# Patient Record
Sex: FEMALE | Race: ASIAN | NOT HISPANIC OR LATINO | URBAN - METROPOLITAN AREA
[De-identification: names, ages, dates, MRNs, and addresses within clinical notes are randomized per-mention and may not be internally consistent; named-entity substitution may affect disease eponyms.]

---

## 2020-01-11 ENCOUNTER — EMERGENCY (EMERGENCY)
Facility: HOSPITAL | Age: 25
LOS: 1 days | Discharge: HOME | End: 2020-01-11
Admitting: EMERGENCY MEDICINE
Payer: COMMERCIAL

## 2020-01-11 VITALS
SYSTOLIC BLOOD PRESSURE: 123 MMHG | RESPIRATION RATE: 15 BRPM | DIASTOLIC BLOOD PRESSURE: 73 MMHG | TEMPERATURE: 98 F | WEIGHT: 104.94 LBS | HEART RATE: 90 BPM | HEIGHT: 62 IN

## 2020-01-11 VITALS
RESPIRATION RATE: 18 BRPM | DIASTOLIC BLOOD PRESSURE: 63 MMHG | HEART RATE: 78 BPM | OXYGEN SATURATION: 98 % | SYSTOLIC BLOOD PRESSURE: 99 MMHG

## 2020-01-11 PROCEDURE — 70486 CT MAXILLOFACIAL W/O DYE: CPT | Mod: 26

## 2020-01-11 PROCEDURE — 72125 CT NECK SPINE W/O DYE: CPT | Mod: 26

## 2020-01-11 PROCEDURE — 70450 CT HEAD/BRAIN W/O DYE: CPT | Mod: 26

## 2020-01-11 PROCEDURE — 99284 EMERGENCY DEPT VISIT MOD MDM: CPT

## 2020-01-11 RX ORDER — BACITRACIN ZINC 500 UNIT/G
1 OINTMENT IN PACKET (EA) TOPICAL ONCE
Refills: 0 | Status: COMPLETED | OUTPATIENT
Start: 2020-01-11 | End: 2020-01-11

## 2020-01-11 RX ORDER — ACETAMINOPHEN 500 MG
975 TABLET ORAL ONCE
Refills: 0 | Status: COMPLETED | OUTPATIENT
Start: 2020-01-11 | End: 2020-01-11

## 2020-01-11 RX ADMIN — Medication 975 MILLIGRAM(S): at 06:00

## 2020-01-11 RX ADMIN — Medication 1 APPLICATION(S): at 06:27

## 2020-01-11 RX ADMIN — Medication 975 MILLIGRAM(S): at 04:42
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
## 2020-01-11 NOTE — ED PROVIDER NOTE - NSFOLLOWUPINSTRUCTIONS_ED_ALL_ED_FT
LOCATION / PHONE NUMBER    [facility] is located at [address] [place] [address] Street (corner of [facility]) in [place].    For Emergency Services/Urgent Care, please call [phone].             Emergency Services/Urgent Care    Urgent care for patients with pain, excessive bleeding, swelling, oral infection and/or trauma is provided with no appointment necessary.    Patients requiring urgent care should register in the New Patient Admissions suite, located on the first floor of [facility] at [address] [place] [address] Street ([facility] of [facility]). After registration, patients will be directed to the Urgent Care Suite on the 2nd floor for treatment. On Saturdays, report directly to the 2nd floor.      Every effort is made to see all patients presenting for urgent care in a timely manner. Due to the unpredictability of the nature of urgent care, however, some patients may not be treated at [facility] but will be referred to our affiliates, [facility] ([address], [place]) or [facility] ([address] [facility]), for treatment.      Hours of Operation: Urgent Care    Emergency Services/Urgent Care is available at [facility] during these hours:    MONDAY THROUGH THURSDAY:  8:30 am - 8:00 pm (on a first-come, first-served basis)    FRIDAY AND SATURDAY  8:30 am - 4:00 pm (on a first-come, first-served basis)    *As an academic dental center, [facility] follows an academic calendar and is closed on some federal holidays. Please see our Holiday/Closure Calendar >>> for more information.

## 2020-01-11 NOTE — ED PROVIDER NOTE - PROGRESS NOTE DETAILS
Call placed to [facility] with [employer] message left regarding patient status of admission and [facility] for meds.    CT reviewed. no acute findings. CT reviewed. no acute findings.  no FB on exam. wound cleansed and flushed. no indication for sutures. abrasions.

## 2020-01-11 NOTE — ED PROVIDER NOTE - CLINICAL SUMMARY MEDICAL DECISION MAKING FREE TEXT BOX
s/p mechanical fall. will check CT.  abrasions, no indication for sutures. no FB on exam. dental fractures noted will f/u with dentist

## 2020-01-11 NOTE — ED PROVIDER NOTE - CARE PLAN
Principal Discharge DX:	Facial abrasion, initial encounter  Secondary Diagnosis:	Dental injury, initial encounter

## 2020-01-11 NOTE — ED PROVIDER NOTE - PHYSICAL EXAMINATION
VITAL SIGNS: I have reviewed nursing notes and confirm.  CONSTITUTIONAL: Well-developed; well-nourished; in no acute distress.  SKIN: abrasion to left frontal, abrasion under left eye, abrasion under nose, Skin is warm and dry, no acute rash.  HEAD: Normocephalic; atraumatic.  EYES: PERRL, EOM intact; conjunctiva and sclera clear.  EARS: tympanic membranes clear bilaterally, No redness, normal landmarks and light reflexes, canal clear without cerumen impaction  THROAT: moist mucous membranes, normal dentition, no erythema, no swelling, no exudates, no drooling, no PTA, uvula midline  +dental fracture +9 +25, no active bleeding. +0.1cm superficial laceration upper inner mucosa.   NECK: Supple; non tender.  CARD: S1, S2 normal; no murmurs, gallops, or rubs. Regular rate and rhythm.  RESP: No wheezes, rales or rhonchi.  ABD: Normal bowel sounds; soft; non-distended; non-tender;  no palpable or pulsating mass; no hepatosplenomegaly.  EXT: Normal ROM. No clubbing, cyanosis or edema.  NEURO: Patient is alert, oriented x person, place and time.  Cranial nerves 2-12 are intact.  Normal gait and speech.  Cerebellar testing normal:  negative Romberg, normal coordination and normal finger to nose, heal to shin and rapid alternating movements.  Normal proprioception and sensory exam.  No pronator drift.  5/5 bl upper extremity and lower extremity strength.  PSYCH: Cooperative, appropriate. VITAL SIGNS: I have reviewed nursing notes and confirm.  CONSTITUTIONAL: Well-developed; well-nourished; in no acute distress.  SKIN: abrasion to left frontal, abrasion under left eye, abrasion under nose, Skin is warm and dry, no acute rash.  HEAD: Normocephalic; atraumatic.  EYES: PERRL, EOM intact; conjunctiva and sclera clear.  EARS: tympanic membranes clear bilaterally, No redness, normal landmarks and light reflexes, canal clear without cerumen impaction  THROAT: moist mucous membranes, normal dentition, no erythema, no swelling, no exudates, no drooling, no PTA, uvula midline  +dental fracture +9 +25, no active bleeding. +0.1cm superficial laceration upper inner mucosa.  +swelling to upper lip  no FB on exam  NECK: Supple; non tender.  CARD: S1, S2 normal; no murmurs, gallops, or rubs. Regular rate and rhythm.  RESP: No wheezes, rales or rhonchi.  ABD: Normal bowel sounds; soft; non-distended; non-tender;  no palpable or pulsating mass; no hepatosplenomegaly.  EXT: Normal ROM. No clubbing, cyanosis or edema.  NEURO: Patient is alert, oriented x person, place and time.  Cranial nerves 2-12 are intact.  Normal gait and speech.  Cerebellar testing normal:  negative Romberg, normal coordination and normal finger to nose, heal to shin and rapid alternating movements.  Normal proprioception and sensory exam.  No pronator drift.  5/5 bl upper extremity and lower extremity strength.  PSYCH: Cooperative, appropriate.

## 2020-01-11 NOTE — ED PROVIDER NOTE - PATIENT PORTAL LINK FT
You can access the FollowMyHealth Patient Portal offered by [facility] by registering at the following website: [email or web address]. By joining [facility]’s FollowMyHealth portal, you will also be able to view your health information using other applications (apps) compatible with our system.

## 2020-01-11 NOTE — ED PROVIDER NOTE - NS ED ROS FT
· CONSTITUTIONAL: no fever and no chills.  - HEENT: +dental fracture  · CARDIOVASCULAR: normal rate and rhythm, no chest pain and no edema.  · RESPIRATORY: no chest pain, no cough, and no shortness of breath.  · GASTROINTESTINAL: no abdominal pain, no bloating, no constipation, no diarrhea, no nausea and no vomiting.  · MUSCULOSKELETAL: no back pain, no musculoskeletal pain, no neck pain, and no weakness.  · SKIN: + abrasions, no jaundice, no lesions, no pruritis, and no rashes.  · NEURO: no loss of consciousness, no gait abnormality, no headache, no sensory deficits, and no weakness.  · PSYCHIATRIC: no known mental health issues.
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
## 2020-01-15 DIAGNOSIS — S00.81XA ABRASION OF OTHER PART OF HEAD, INITIAL ENCOUNTER: ICD-10-CM
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
## 2020-01-15 DIAGNOSIS — S09.93XA UNSPECIFIED INJURY OF FACE, INITIAL ENCOUNTER: ICD-10-CM
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
